# Patient Record
Sex: FEMALE | Race: WHITE | NOT HISPANIC OR LATINO | Employment: STUDENT | URBAN - METROPOLITAN AREA
[De-identification: names, ages, dates, MRNs, and addresses within clinical notes are randomized per-mention and may not be internally consistent; named-entity substitution may affect disease eponyms.]

---
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
## 2025-03-24 ENCOUNTER — OFFICE VISIT (OUTPATIENT)
Dept: PRIMARY CARE CLINIC | Facility: CLINIC | Age: 20
End: 2025-03-24
Payer: COMMERCIAL

## 2025-03-24 VITALS
WEIGHT: 114.63 LBS | HEIGHT: 63 IN | SYSTOLIC BLOOD PRESSURE: 112 MMHG | DIASTOLIC BLOOD PRESSURE: 62 MMHG | RESPIRATION RATE: 16 BRPM | HEART RATE: 85 BPM | TEMPERATURE: 99 F | BODY MASS INDEX: 20.31 KG/M2 | OXYGEN SATURATION: 98 %

## 2025-03-24 DIAGNOSIS — F41.1 GAD (GENERALIZED ANXIETY DISORDER): Primary | ICD-10-CM

## 2025-03-24 DIAGNOSIS — Z76.89 ENCOUNTER TO ESTABLISH CARE: ICD-10-CM

## 2025-03-24 DIAGNOSIS — R41.840 CONCENTRATION DEFICIT: ICD-10-CM

## 2025-03-24 PROCEDURE — 3008F BODY MASS INDEX DOCD: CPT | Mod: CPTII,S$GLB,, | Performed by: FAMILY MEDICINE

## 2025-03-24 PROCEDURE — G2211 COMPLEX E/M VISIT ADD ON: HCPCS | Mod: S$GLB,,, | Performed by: FAMILY MEDICINE

## 2025-03-24 PROCEDURE — 1159F MED LIST DOCD IN RCRD: CPT | Mod: CPTII,S$GLB,, | Performed by: FAMILY MEDICINE

## 2025-03-24 PROCEDURE — 1160F RVW MEDS BY RX/DR IN RCRD: CPT | Mod: CPTII,S$GLB,, | Performed by: FAMILY MEDICINE

## 2025-03-24 PROCEDURE — 99999 PR PBB SHADOW E&M-NEW PATIENT-LVL V: CPT | Mod: PBBFAC,,, | Performed by: FAMILY MEDICINE

## 2025-03-24 PROCEDURE — 3078F DIAST BP <80 MM HG: CPT | Mod: CPTII,S$GLB,, | Performed by: FAMILY MEDICINE

## 2025-03-24 PROCEDURE — 99205 OFFICE O/P NEW HI 60 MIN: CPT | Mod: S$GLB,,, | Performed by: FAMILY MEDICINE

## 2025-03-24 PROCEDURE — 3074F SYST BP LT 130 MM HG: CPT | Mod: CPTII,S$GLB,, | Performed by: FAMILY MEDICINE

## 2025-03-24 RX ORDER — SERTRALINE HYDROCHLORIDE 50 MG/1
TABLET, FILM COATED ORAL
Qty: 90 TABLET | Refills: 0 | Status: SHIPPED | OUTPATIENT
Start: 2025-03-24 | End: 2025-06-23

## 2025-03-24 RX ORDER — DROSPIRENONE AND ETHINYL ESTRADIOL TABLETS 0.02-3(28)
1 KIT ORAL
COMMUNITY

## 2025-03-24 RX ORDER — PROPRANOLOL HYDROCHLORIDE 10 MG/1
10 TABLET ORAL
Qty: 60 TABLET | Refills: 2 | Status: SHIPPED | OUTPATIENT
Start: 2025-03-24

## 2025-03-24 NOTE — PROGRESS NOTES
Chief Complaint  Chief Complaint   Patient presents with    [facility]    Anxiety     New patient to me and to [facility]. I see her mother Romy Wilson.     HPI  Cristy Wilson is a 19 y.o. female with multiple medical diagnoses as listed in the medical history and problem list that presents for  in person visit.     History of Present Illness    CHIEF COMPLAINT:  - Patient, a college student, presents to [facility] care with primary care and adult medicine, and to discuss anxiety issues.    HPI:  Patient is a carole in college studying general business with a long-standing history of anxiety since childhood, which has worsened in recent years. She reports tachycardia upon waking and feeling overwhelmed by daily tasks.    She has significant sleep disturbances, stating she has not slept through the night in a few years, likely since high school. She frequently wakes up in the middle of the night and has anxiety when waking for the day. She feels constantly overwhelmed and stressed about her responsibilities, leading to difficulty completing tasks and procrastination.    She has severe test anxiety, present since high school. She struggles with standardized tests like the ACT and finds it challenging to finish tests on time in college. She uses the entire allotted time for tests and often second-guesses her answers.    Her anxiety has begun to impact her academic performance. She currently has two C's and a B, which she considers her worst grades. She feels overwhelmed by her current semester, describing it as her hardest so far.    She works part-time as a cheer  and at a supper club, which adds to her stress levels. She occasionally feels stressed at work, particularly when the supper club is very busy.    She has not previously been on any anxiety medications or received formal counseling for her anxiety. She has used various coping strategies in the past but feels they are no  "longer sufficient given her current stress levels.    She denies any difficulty getting along with other people, and any formal diagnoses of ADHD or depression.    PMH:  - Anxiety: Since childhood, worsened in college    FAMILY HISTORY:  - Mother: History of anxiety, seasonal affective disorder, ADHD    SOCIAL HISTORY:  - Occupation: College student, cheer , works at [employer]         No questionnaires on file.       Pmh, Psh, Family Hx, Social Hx, HM updated in Epic Tabs today.    Review of Systems   Constitutional:  Negative for activity change, appetite change, chills, fatigue and fever.   HENT:  Negative for ear pain and trouble swallowing.    Eyes:  Negative for pain and visual disturbance.   Respiratory:  Negative for cough and shortness of breath.    Cardiovascular:  Negative for chest pain, palpitations and leg swelling.   Gastrointestinal:  Negative for abdominal distention, abdominal pain, blood in stool, nausea and vomiting.   Endocrine: Negative for cold intolerance and heat intolerance.   Genitourinary:  Negative for dysuria and frequency.   Musculoskeletal:  Negative for joint swelling, myalgias and neck pain.   Skin:  Negative for color change and rash.   Neurological:  Negative for dizziness and headaches.   Psychiatric/Behavioral:  Positive for decreased concentration. Negative for agitation, behavioral problems, confusion, dysphoric mood, hallucinations and sleep disturbance. The patient is nervous/anxious. The patient is not hyperactive.         Objective:     Vitals:    03/24/25 1503   BP: 112/62   BP Location: Left arm   Patient Position: Sitting   Pulse: 85   Resp: 16   Temp: 98.7 °F (37.1 °C)   TempSrc: Tympanic   SpO2: 98%   Weight: 52 kg (114 lb 10.2 oz)   Height: 5' 2.5" (1.588 m)     Wt Readings from Last 10 Encounters:   03/24/25 52 kg (114 lb 10.2 oz)   11/06/17 46.9 kg (103 lb 6.3 oz)   10/07/15 32.6 kg (71 lb 13.9 oz)     Physical Exam    TEST RESULTS:  - Anxiety questionnaire: " Today, Score of 17, Interpretation: severe anxiety       Physical Exam  Vitals reviewed.   Constitutional:       Appearance: Normal appearance. She is well-developed and normal weight.   HENT:      Head: Normocephalic and atraumatic.      Right Ear: Tympanic membrane and external ear normal.      Left Ear: Tympanic membrane and external ear normal.      Nose: Nose normal.      Mouth/Throat:      Mouth: Mucous membranes are moist.      Pharynx: Oropharynx is clear.   Eyes:      Conjunctiva/sclera: Conjunctivae normal.      Pupils: Pupils are equal, round, and reactive to light.   Neck:      Thyroid: No thyromegaly.   Cardiovascular:      Rate and Rhythm: Normal rate and regular rhythm.      Heart sounds: Normal heart sounds. No murmur heard.     No friction rub. No gallop.   Pulmonary:      Effort: Pulmonary effort is normal. No respiratory distress.      Breath sounds: Normal breath sounds. No wheezing or rales.   Abdominal:      General: Bowel sounds are normal. There is no distension.      Palpations: Abdomen is soft.      Tenderness: There is no abdominal tenderness. There is no rebound.   Musculoskeletal:         General: Normal range of motion.      Cervical back: Normal range of motion and neck supple.   Lymphadenopathy:      Cervical: No cervical adenopathy.   Skin:     General: Skin is warm and dry.      Findings: No rash.   Neurological:      General: No focal deficit present.      Mental Status: She is alert and oriented to person, place, and time. Mental status is at baseline.   Psychiatric:         Attention and Perception: Attention and perception normal.         Mood and Affect: Affect normal. Mood is anxious.         Speech: Speech normal.         Behavior: Behavior normal.         Thought Content: Thought content normal.         Cognition and Memory: Cognition and memory normal.         Judgment: Judgment normal.      Comments: Impaired concentration, test anxiety, interrupted sleep           Assessment:         3/24/2025     3:23 PM   PACO-7   Was test performed? Yes   1. Feeling nervous, anxious, or on edge? Nearly everyday   2. Not being able to stop or control worrying? More than half the days   3. Worrying too much about different things? More than half the days   4. Trouble relaxing? Nearly everyday   5. Being so restless that it is hard to sit still? Nearly everyday   6. Becoming easily annoyed or irritable? Nearly everyday   7. Feeling afraid as if something awful might happen? Several days   8. If you checked off any problems, how difficult have these problems made it for you to do your work, take care of things at home, or get along with other people? Somewhat difficult   PACO-7 Score 17   Number answered (out of first 7) 7   Interpretation Severe Anxiety       Plan:   Assessment & Plan    Z76.89 Encounter to establish care  F41.1 PACO (generalized anxiety disorder)  R41.840 Concentration deficit    IMPRESSION:  - Assessed anxiety symptoms using standardized questionnaire, resulting in score of 17 indicating significant anxiety.  - Considered family history of anxiety, seasonal affective disorder, and ADHD in treatment approach.  - Determined anxiety is likely primary issue, potentially mimicking ADHD symptoms due to severity.    PLAN SUMMARY:  - Start Zoloft (sertraline) 25 mg daily for 5 days, then increase to 50 mg daily  - Take medication in morning with food  - Referred to neuromedical center for ADHD evaluation  - Incorporate short exercise breaks during study sessions  - Explore [facility] classes and find a consistent day to attend  - Record lectures for listening during walks or car rides  - Complete online anxiety questionnaire prior to follow-up  - Download [employer] chrissy for medical records access  - Follow up in 4 weeks for video visit to assess medication efficacy    ENCOUNTER TO ESTABLISH CARE:  - Follow up in 4 weeks for video visit to assess medication efficacy.  - Complete  online anxiety questionnaire prior to follow-up visit.  - Download [employer] chrissy for access to medical records, messaging, and future video visits.    PACO (GENERALIZED ANXIETY DISORDER):  - Explained the role of serotonin in anxiety and how SSRIs work to increase serotonin levels.  - Started Zoloft (sertraline) 25 mg daily for 5 days, then increase to 50 mg daily.  - Take in morning with food.  - Discussed potential side effects, including vivid dreams in first few nights, and informed about safety profile, including its use during pregnancy.    CONCENTRATION DEFICIT:  - Suggested neuropsychological evaluation to assess for potential ADHD and determine if accommodations are warranted.  - Referred to neuromedical center for ADHD evaluation.    LIFESTYLE CHANGES:  - Patient to incorporate short exercise breaks (e.g., stretching, jumping jacks, planks) during study sessions to refresh and refocus.  - Recommend exploring [facility] classes and finding a consistent day to attend.  - To maximize study time, consider recording lectures and listening to them during walks or car rides.       Cristy was seen today for establish care and anxiety.    Diagnoses and all orders for this visit:    PACO (generalized anxiety disorder)  -     sertraline (ZOLOFT) 50 MG tablet; Take 0.5 tablets (25 mg total) by mouth once daily for 5 days, THEN 1 tablet (50 mg total) once daily.  -     propranoloL (INDERAL) 10 MG tablet; Take 1 tablet (10 mg total) by mouth every 4 to 6 hours as needed (anxiety, tests).    Encounter to establish care    Concentration deficit  -     Ambulatory referral/consult to Psychology; Future        X-ray Knee Ortho Left  Narrative: Technique: AP standing views of both knees as well as lateral and Merchant views of the left knee and a Merchant view of the right knee were obtained.    Comparison: none    Findings: There is no radiographic evidence of acute osseous, articular, or soft tissue abnormality. Joint spaces  are well preserved.  Impression: No acute findings     Electronically signed by: SHI BETHEA MD  Date:     11/06/17  Time:    15:46     The ASCVD Risk score (Humaira DK, et al., 2019) failed to calculate for the following reasons:    The 2019 ASCVD risk score is only valid for ages 40 to 79    Follow-up: Follow up in about 4 weeks (around 4/21/2025) for f/u VV Dr. Ilda caraballo .    I spent a total of   60    minutes face to face and non-face to face on the date of this visit.This includes time preparing to see the patient (eg, review of tests, notes), obtaining and/or reviewing additional history from an independent historian and/or outside medical records, documenting clinical information in the electronic health record, independently interpreting results and/or communicating results to the patient/family/caregiver, or care coordinator.  Visit today included increased complexity associated with the care of the episodic problem addressed and managing the longitudinal care of the patient due to the serious and/or complex managed problem(s).    This note was generated with the assistance of ambient listening technology. Verbal consent was obtained by the patient and accompanying visitor(s) for the recording of patient appointment to facilitate this note. I attest to having reviewed and edited the generated note for accuracy, though some syntax or spelling errors may persist. Please contact the author of this note for any clarification.       Patient Instructions   ADD Testing and Counseling Services     NEUROMEDICAL CENTER  Dr. May Feldman  [phone]  [email or web address]

## 2025-03-24 NOTE — PATIENT INSTRUCTIONS
ADD Testing and Counseling Services     NEUROMEDICAL CENTER  Dr. May Feldman  [phone]  [email or web address]    
weight gain

## 2025-04-14 ENCOUNTER — PATIENT MESSAGE (OUTPATIENT)
Dept: PRIMARY CARE CLINIC | Facility: CLINIC | Age: 20
End: 2025-04-14

## 2025-05-21 ENCOUNTER — TELEPHONE (OUTPATIENT)
Dept: PRIMARY CARE CLINIC | Facility: CLINIC | Age: 20
End: 2025-05-21

## 2025-05-21 NOTE — TELEPHONE ENCOUNTER
----- Message from Miranda sent at 5/19/2025 12:03 PM CDT -----  Contact: Cristy  Type:  Reschedule virtual Appointment RequestName of Caller:Reid ask for the virtual visit todayWould the patient rather a call back or a response via [employer]? Call [facility] Call Back Number:[phone]!

## 2025-06-12 ENCOUNTER — OFFICE VISIT (OUTPATIENT)
Dept: PRIMARY CARE CLINIC | Facility: CLINIC | Age: 20
End: 2025-06-12
Payer: COMMERCIAL

## 2025-06-12 DIAGNOSIS — R41.840 CONCENTRATION DEFICIT: ICD-10-CM

## 2025-06-12 DIAGNOSIS — F41.1 GAD (GENERALIZED ANXIETY DISORDER): Primary | ICD-10-CM

## 2025-06-12 PROCEDURE — G2211 COMPLEX E/M VISIT ADD ON: HCPCS | Mod: 95,,, | Performed by: FAMILY MEDICINE

## 2025-06-12 PROCEDURE — 1159F MED LIST DOCD IN RCRD: CPT | Mod: CPTII,95,, | Performed by: FAMILY MEDICINE

## 2025-06-12 PROCEDURE — 98006 SYNCH AUDIO-VIDEO EST MOD 30: CPT | Mod: 95,,, | Performed by: FAMILY MEDICINE

## 2025-06-12 PROCEDURE — 1160F RVW MEDS BY RX/DR IN RCRD: CPT | Mod: CPTII,95,, | Performed by: FAMILY MEDICINE

## 2025-06-12 RX ORDER — CITALOPRAM 10 MG/1
10 TABLET ORAL DAILY
Qty: 30 TABLET | Refills: 11 | Status: SHIPPED | OUTPATIENT
Start: 2025-06-12 | End: 2026-06-12

## 2025-06-12 NOTE — PROGRESS NOTES
Chief Complaint  Chief Complaint   Patient presents with    Anxiety       HPI  Cristy Wilson is a 20 y.o. female with multiple medical diagnoses as listed in the medical history and problem list that presents for  in person visit.     History of Present Illness    CHIEF COMPLAINT:  - Patient presents for a follow-up visit to discuss anxiety management and medication side effects.    HPI:  Patient is following up on anxiety management. At her previous visit, she was prescribed Zoloft for anxiety. She had significant side effects, including loss of appetite resulting in a 10-pound weight loss within a week, and increased anxiety. She had nausea after minimal food intake and was only able to consume about one item per day. She also tried Propranolol as needed, but did not notice any improvement in symptoms and subsequently discontinued it.    Her anxiety has improved since school ended for the summer, but she still has occasional anxiety triggers, particularly during busy periods. She feels restless when occupied, but anxious when inactive, struggling to find a balance.    She has started exercising 5 days a week at a [employer] facility, which improves her daily well-being. She plans to take summer classes in July.    She denies significant anxiety symptoms since school ended for the summer, except for occasional triggers.    MEDICATIONS:  - Propranolol, as needed, for anxiety before tests or events  - Discontinued Zoloft for anxiety due to side effects including increased anxiety, inability to eat, and weight loss (about 10 lbs in a week)  - Discontinued Propranolol for anxiety due to lack of perceived effectiveness    PMH:  - Anxiety         Ohs Hpi Reason For Visit    6/12/2025 12:22 PM CDT - Filed by Patient   What is your primary reason for visit? Other/Annual   Have you experienced any of the following:   Change in activity? No   Unexpected weight change? No   Neck pain? No   Hearing loss? No   Runny  nose? No   Trouble swallowing? No   Eye discharge? No   Changes in vision? No   Chest tightness? No   Wheezing? No   Chest pain? No   Heart beating fast or racing? No   Blood in stool? No   Constipation? No   Vomiting? No   Diarrhea? No   Drinking much more than usual? No   Urinating much more than usual? No   Difficulty urinating? No   Blood in the urine? No   Menstrual problem? No   Painful urination? No   Joint swelling? No   Joint pain? No   Headaches? No   Weakness? No   Confusion? No   Feeling depressed? No     Ohs Peq Sdoh    6/12/2025 12:25 PM CDT - Filed by Patient   This questionnaire should take approximately 5 to 10 minutes to complete.  To begin, press Let's Begin and then press Continue. Let's Begin   On average, how many days per week do you engage in moderate to strenuous exercise (like a brisk walk)? 5 days   On average, how many minutes do you engage in exercise at this level? 60 min   Do you feel stress - tense, restless, nervous, or anxious, or unable to sleep at night because your mind is troubled all the time - these days? To some extent   How often do you feel lonely or isolated from those around you? Rarely   How often do you need to have someone help you when you read instructions, pamphlets, or other written material from your doctor or pharmacy? Never   How hard is it for you to pay for the very basics like food, housing, medical care, and heating? Not very hard   In the past 12 months has the electric, gas, oil, or water company threatened to shut off services in your home? No   Within the past 12 months, you worried that your food would run out before you got the money to buy more. Never true   Within the past 12 months, the food you bought just didn't last and you didn't have money to get more. Never true   In the past 12 months, has lack of transportation kept you from medical appointments or from getting medications? No   In the past 12 months, has lack of transportation kept you from  meetings, work, or from getting things needed for daily living? No   In the last 12 months, was there a time when you were not able to pay the mortgage or rent on time? No   In the past 12 months, how many times have you moved where you were living?    At any time in the past 12 months, were you homeless or living in a shelter (including now)? No   How often do you have a drink containing alcohol? 2-4 times a month   How many drinks containing alcohol do you have on a typical day when you are drinking? 1 or 2   How often do you have six or more drinks on one occasion? Never            Pmh, Psh, Family Hx, Social Hx, HM updated in Epic Tabs today.    Review of Systems   Constitutional:  Negative for activity change and unexpected weight change.   HENT:  Negative for hearing loss, rhinorrhea and trouble swallowing.    Eyes:  Negative for discharge and visual disturbance.   Respiratory:  Negative for chest tightness and wheezing.    Cardiovascular:  Negative for chest pain and palpitations.   Gastrointestinal:  Negative for blood in stool, constipation, diarrhea and vomiting.   Endocrine: Negative for polydipsia and polyuria.   Genitourinary:  Negative for difficulty urinating, dysuria, hematuria and menstrual problem.   Musculoskeletal:  Negative for arthralgias, joint swelling and neck pain.   Neurological:  Negative for weakness and headaches.   Psychiatric/Behavioral:  Positive for decreased concentration. Negative for confusion and dysphoric mood. The patient is nervous/anxious.         Objective:   There were no vitals filed for this visit.  Wt Readings from Last 10 Encounters:   03/24/25 52 kg (114 lb 10.2 oz)   11/06/17 46.9 kg (103 lb 6.3 oz)   10/07/15 32.6 kg (71 lb 13.9 oz)     Physical Exam    TEST RESULTS:  - ADD/ADHD testing: Referral sent on March 24, 2025, approved and authorized. [facility] conducted at [facility]. Includes standardized testing, computerized testing, and evaluation for  "anxiety, mood disorders, and ADD.       Physical Exam  Vitals reviewed.   Constitutional:       General: She is awake. She is not in acute distress.     Appearance: Normal appearance. She is well-developed, well-groomed and normal weight. She is not ill-appearing.   HENT:      Head: Normocephalic and atraumatic.      Right Ear: External ear normal.      Left Ear: External ear normal.      Nose: Nose normal.      Mouth/Throat:      Lips: Pink.   Eyes:      Conjunctiva/sclera: Conjunctivae normal.   Pulmonary:      Effort: Pulmonary effort is normal.   Neurological:      Mental Status: She is alert.   Psychiatric:         Attention and Perception: Attention and perception normal. She is attentive.         Mood and Affect: Affect normal. Mood is anxious. Mood is not depressed. Affect is not labile, blunt, angry or inappropriate.         Speech: Speech normal. She is communicative. Speech is not rapid and pressured, delayed, slurred or tangential.         Behavior: Behavior normal. Behavior is not agitated, slowed, aggressive, withdrawn, hyperactive or combative. Behavior is cooperative.         Thought Content: Thought content normal. Thought content is not paranoid or delusional. Thought content does not include homicidal or suicidal ideation. Thought content does not include homicidal or suicidal plan.         Cognition and Memory: Cognition and memory normal. Memory is not impaired. She does not exhibit impaired recent memory or impaired remote memory.         Judgment: Judgment normal. Judgment is not impulsive or inappropriate.      Comments: Concentration difficulties with school, tests          Assessment:   LABS:   No results found for: "HGBA1C"   No results found for: "CHOL"  No results found for: "LDLCALC"  No results found for: "WBC", "HGB", "HCT", "PLT", "CHOL", "TRIG", "HDL", "LDLDIRECT", "ALT", "AST", "NA", "K", "CL", "CREATININE", "BUN", "CO2", "TSH", "PSA", "INR", "GLUF", "HGBA1C", "MICROALBUR"    Plan: "   1. PACO (generalized anxiety disorder)  -     Ambulatory referral/consult to Psychology; Future; Expected date: 06/19/2025  -     citalopram (CELEXA) 10 MG tablet; Take 1 tablet (10 mg total) by mouth once daily.  Dispense: 30 tablet; Refill: 11    2. Concentration deficit  -     Ambulatory referral/consult to Psychology; Future; Expected date: 06/19/2025      Assessment & Plan    F41.1 PACO (generalized anxiety disorder)  R41.840 Concentration deficit    IMPRESSION:  Switched from Zoloft to Celexa 10 mg daily for anxiety due to GI intolerance.  Low threshold to switch to Effexor if Celexa not tolerated.  Considered ADHD evaluation to differentiate between anxiety and attention deficit issues.    PLAN SUMMARY:  - [facility]  - Switched from Zoloft to Celexa 10 mg daily due to GI intolerance  - Continue Propranolol as needed for anxiety-inducing events  - Explained differences between Zoloft, Celexa, and Effexor    PACO (GENERALIZED ANXIETY DISORDER):  - Explained differences between Zoloft, Celexa, and Effexor regarding mechanism of action and potential side effects.  - Switched from Zoloft to Celexa 10 mg daily due to GI intolerance.  - Continue Propranolol as needed for anxiety-inducing events, particularly before presentations or exams.    CONCENTRATION DEFICIT:  - [facility].           X-ray Knee Ortho Left  Narrative: Technique: AP standing views of both knees as well as lateral and Merchant views of the left knee and a Merchant view of the right knee were obtained.    Comparison: none    Findings: There is no radiographic evidence of acute osseous, articular, or soft tissue abnormality. Joint spaces are well preserved.  Impression: No acute findings     Electronically signed by: SHI BETHEA MD  Date:     11/06/17  Time:    15:46     The ASCVD Risk score (Humaira CASTANEDA, et al., 2019) failed to calculate for the following reasons:     The 2019 ASCVD risk score is only valid for ages 40 to 79    Follow-up: Follow up in about 3 months (around 9/12/2025) for OV Sterling Robbins NP or Joy 3 mo PACO, ADD evaluation through [facility] .    I spent a total of  30     minutes face to face and non-face to face on the date of this visit.This includes time preparing to see the patient (eg, review of tests, notes), obtaining and/or reviewing additional history from an independent historian and/or outside medical records, documenting clinical information in the electronic health record, independently interpreting results and/or communicating results to the patient/family/caregiver, or care coordinator.  Visit today included increased complexity associated with the care of the episodic problem addressed and managing the longitudinal care of the patient due to the serious and/or complex managed problem(s).    This note was generated with the assistance of ambient listening technology. Verbal consent was obtained by the patient and accompanying visitor(s) for the recording of patient appointment to facilitate this note. I attest to having reviewed and edited the generated note for accuracy, though some syntax or spelling errors may persist. Please contact the author of this note for any clarification.       Patient Instructions   You may want to contact [facility], here is the contact information.     If you have specific questions about your statement, call [facility] Patient Account Customer Service Department Monday - Thursday from 7:30 a.m. to 6:00 p.m. and Friday from 8:00 a.m. and 4:30 p.m.    To contact us in writing:    [facility]  [address] [place] [address]    To contact us by telephone:    Phone: [phone]  Toll Free: [phone]    To contact us by email: [email or web address]      ADD Testing and Counseling Services     [facility]  Dr. May Navarro Dr.  Susie Feldman  [phone]  [email or web address]    March 24, 2025- referral sent in         Patient Demographics           Cristy Wilson    Legal sex: Female    2005, 20 yrs    SSN:     PCP: Meli Louie MD          [address] [facility] [address]       [phone] (M)       [email or web address]            Verification Status: Verified         Patient Name: Cristy Wilson   MRN: [id number]   PCP: MELI LOUIE ([phone])   Emerg Contact: SteveParadise   Emerg Contact Ph: [phone]        Referral Information    Referral # Creation Date Referral Status Status Update    [id number] 03/24/2025 Pending Review 06/11/2025: Status History     Status Reason Referral Type Referral Reasons Referral Class   System Automatically Pend Psychiatric Specialty Services Required Outgoing     To Specialty To Provider To Location/Place of Service To Department   Neuropsychology [facility] - none none     To Vendor Referred By By Location/Place of Service By Department   none Meli Louie MD [facility] PRIMARY CARE     Priority Start Date Expiration Date Referral Entered By   Routine 03/24/2025 03/24/2026 Meli Louie MD     Visits Requested Visits Authorized Visits Completed Visits Scheduled   1 1       Primary Coverage    Payer Plan Sponsor Code Group Number Group Name   [facility]  [id number]      Primary Subscriber    Subscriber ID Subscriber Name Subscriber N Subscriber Address   [id number] PARADISE WILSONAS  [address] [place] [address]     Procedure Information    Service Details  Procedure Modifiers Provider Requested Approved   REF92 - AMB REFERRAL/CONSULT TO PSYCHOLOGY none [facility] - 1 1     Scheduling    None     Diagnosis Information    Diagnosis   R41.840 (ICD-10-CM) -  Concentration deficit     Referral Order    Order   Ambulatory referral/consult to Psychology (Order # [id number]) on 03/24/2025   View Encounter

## 2025-06-12 NOTE — PATIENT INSTRUCTIONS
You may want to contact [facility], here is the contact information.     If you have specific questions about your statement, call [facility] Customer Service Department Monday - Thursday from 7:30 a.m. to 6:00 p.m. and Friday from 8:00 a.m. and 4:30 p.m.    To contact us in writing:    [facility]  [facility] [place] [address]    To contact us by telephone:    Phone: [phone]  Toll Free: [phone]    To contact us by email: [email or web address]      ADD Testing and Counseling Services     NEUR[facility]ICAL CENTER  Dr. May Feldman  [phone]  [email or web address]    March 24, 2025- referral sent in         Patient Demographics           Cristy Wilson    Legal sex: Female    2005, 20 yrs    SSN:     PCP: Meli Martinez MD          [address] [facility] [id number] [phone] (W)       [email or web address]            Verification Status: Verified         Patient Name: Cristy Wilson   MRN: [id number]   PCP: MELI MARTINEZ ([phone])   Emerg Contact: Mg Wilson   Emerg Contact Ph: [phone]        Referral Information    Referral # Creation Date Referral Status Status Update    [id number] 03/24/2025 Pending Review 06/11/2025: Status History     Status Reason Referral Type Referral Reasons Referral Class   System Automatically Pend Psychiatric Specialty Services Required Outgoing     To Specialty To Provider To Location/Place of Service To Department   Ne[facility]psychology [facility] - none none     To Vendor Referred By By Location/Place of Service By Department   none Meli Martinez MD [facility] PRIMARY CARE     Priority Start Date Expiration Date Referral Entered By   Routine 03/24/2025 03/24/2026 Meli Martinez MD     Visits  Requested Visits Authorized Visits Completed Visits Scheduled   1 1       Primary Coverage    Payer Plan Sponsor Code Group Number Group Name   BLUE [facility] BCBS [facility] PP  [id number]      Primary Subscriber    Subscriber ID Subscriber Name Subscriber SSN Subscriber Address   [id number] PARADISE GONZALEZ  [id number] [place] [address]     Procedure Information    Service Details  Procedure Modifiers Provider Requested Approved   REF92 - AMB REFERRAL/CONSULT TO PSYCHOLOGY none [facility] - 1 1     Scheduling    None     Diagnosis Information    Diagnosis   R41.840 (ICD-10-CM) - Concentration deficit     Referral Order    Order   Ambulatory referral/consult to Psychology (Order # [id number]) on 03/24/2025   View Encounter

## 2025-06-18 ENCOUNTER — PATIENT MESSAGE (OUTPATIENT)
Dept: RESEARCH | Facility: HOSPITAL | Age: 20
End: 2025-06-18
Payer: COMMERCIAL